# Patient Record
Sex: FEMALE | Race: WHITE | URBAN - METROPOLITAN AREA
[De-identification: names, ages, dates, MRNs, and addresses within clinical notes are randomized per-mention and may not be internally consistent; named-entity substitution may affect disease eponyms.]

---

## 2022-12-09 ENCOUNTER — HOSPITAL ENCOUNTER (OUTPATIENT)
Dept: PHYSICAL THERAPY | Age: 37
Setting detail: RECURRING SERIES
Discharge: HOME | End: 2022-12-12
Payer: COMMERCIAL

## 2022-12-09 PROCEDURE — 97530 THERAPEUTIC ACTIVITIES: CPT

## 2022-12-09 PROCEDURE — 97161 PT EVAL LOW COMPLEX 20 MIN: CPT

## 2022-12-09 ASSESSMENT — PAIN SCALES - GENERAL: PAINLEVEL_OUTOF10: 0

## 2022-12-09 NOTE — PROGRESS NOTES
Sukhjinder Mendoza  : 1985  Primary:   Secondary:  Cloyce Gun  [address] [facility] [address]  Phone: [phone]  Fax: [phone] Plan Frequency: 1x/week for 90 days    Plan of Care/Certification Expiration Date: 23      PT Visit Info: Total # of Visits to Date: 1  Progress Note Due Date: 23     Visit Count:  1   OUTPATIENT PHYSICAL THERAPY:OP NOTE TYPE: Treatment Note 2022       Episode  }Appt Desk             Treatment Diagnosis:  Lack of coordination (muscle incoordination) (R27.8)  Mixed incontinence (Urge and stress incontinence) (N39.46)  Feeling of incomplete bladder emptying (R39.14)  Low back pain (M54.5 )  Urgency of urination (R39.15)  Medical/Referring Diagnosis:  Stress incontinence (female) (male) [N39.3]  Referring Physician:  Provider, None  MD Orders:  PT Eval and Treat   Date of Onset:  No data recorded   Allergies:   Patient has no allergy information on record. Restrictions/Precautions:  Restrictions/Precautions: None  Required Braces or Orthoses?: No  No data recorded     Interventions Planned (Treatment may consist of any combination of the following):    Current Treatment Recommendations: Strengthening; ROM; Endurance training; Neuromuscular re-education; Pain management; Home exercise program; Patient/Caregiver education & training; Modalities; Therapeutic activities     Subjective Comments: mixed incontinence, pregnancy     Initial:}    0/10Post Session:       0/10  Medications Last Reviewed:  2022  Updated Objective Findings:  See evaluation note from today  Treatment   THERAPEUTIC ACTIVITY: ( 25 minutes): Functional activity education regarding anatomy, pathology and role of pelvic floor muscle (PFM) function in relation to presenting symptoms and role of pelvic floor therapy in conservative treatment.  and Instruction on coordinated pelvic floor and diaphragmatic breathing to improve kinesthetic awareness of pelvic muscle mobility and restore proper motor recruitment patterns with breathing, posture, and functional movement (e.g. appropriate lift/contraction with increased IAP such as a cough, laugh, sneeze to prevent incontinence as well as appropriate relaxation/drop to reduce pain with vaginal penetration). TA Educational Topic Notes Date Completed   Pathology/Anatomy/PFM Function Reviewed  12/9/22   Bladder health education     Urinary urge suppression     The knack     Voiding strategies     Nocturia tips     Bowel/Bladder log     Bowel health education     Constipation care     Diarrhea/Fecal leakage     Colonic massage     Toilet positioning     Defecation dynamics     Sources of fiber     Return to intercourse/Dilator training     Sexual positioning     Lubricants/vaginal moisturizers     Vulvovaginal health/vaginal irritants     Body mechanics Reviewed and practiced  12/9/22   Posture/ergonomics     Diaphragmatic breathing Reviewed with malick on exhale  12/9/22   Resources and technology     Other patient education       THERAPEUTIC EXERCISE: (0 minutes):    Exercises per grid below to improve mobility, strength, balance, and coordination. Required moderate visual, verbal, and tactile cues to promote proper body alignment, promote proper body posture, promote proper body mechanics, and promote proper body breathing techniques. Progressed resistance, range, repetitions, and complexity of movement as indicated. Date:  12/9/22 Date:   Date:     Activity/Exercise Parameters Parameters Parameters   butterfly reviewed     Double knee to chest reviewed     tamannaon reviewed       MANUAL THERAPY: (0 minutes): Soft tissue mobilization was utilized and necessary because of the patient's painful spasm and restricted motion of soft tissue.    Date Type Location Comments   12/9/2022 Internal assessment/treatment Via vaginal canal Not today                                        (Used abbreviations: MET - muscle energy technique; SCS- Strain counter strain; CTM-Connective tissue mobilizations; CR- Contract/Relax; SP- Sustained pressure; PIT- Positional inhibition techniques; STM Soft -tissue mobilization; MM- Myofascial mobilization; TrP-Trigger point release; IASTM- Instrument assisted soft tissue mobilizations, TDN-Trigger point dry needling)    Pt gives verbal consent to internal vaginal assessment/treatment without chaperon present. Treatment/Session Summary:    Treatment Assessment:   Pt reports good understanding of plan of care, as well as prescribed home exercise program. All questions were answered to pts satisfaction. Pt was invited to call with any further questions or concerns. Communication/Consultation:  None today  Equipment provided today:  None  Recommendations/Intent for next treatment session: Next visit will focus on   Biofeedback  Urge suppresiion  Stretching and strengthening.     Total Treatment Billable Duration:  55 minutes       Naz Lugo PT       Charge Capture  }Post Session Pain  PT Visit Info  [employer] Portal  MD Guidelines  Scanned Media  Benefits  MyChart    Future Appointments   Date Time Provider Pam Acuna   1/6/2023  1:00 PM Naz Lugo PT [facility]   1/11/2023 11:00 AM MICHEAL Christensen   1/18/2023 11:00 AM Naz Lugo PT [facility]   1/25/2023 11:00 AM MICHEAL Christensen

## 2022-12-09 NOTE — THERAPY EVALUATION
Jaylin Maybrook  : 1985  Primary:   Secondary:  Leticia Antonio  [address] [facility] [address]  Phone: [phone]  Fax: [phone] Plan Frequency: 1x/week for 90 days  Plan of Care/Certification Expiration Date: 23    PT Visit Info: Total # of Visits to Date: 1  Progress Note Due Date: 23    Visit Count:  1                OUTPATIENT PHYSICAL THERAPY:             OP NOTE TYPE: Initial Assessment 2022               Episode  Appt Desk         Treatment Diagnosis:  Lack of coordination (muscle incoordination) (R27.8)  Mixed incontinence (Urge and stress incontinence) (N39.46)  Frequency of micturition (R35.0)  Feeling of incomplete bladder emptying (R39.14)  Low back pain (M54.5 )  Urgency of urination (R39.15)  Medical/Referring Diagnosis:  Stress incontinence (female) (male) [N39.3]  Referring Physician:  Provider, None  MD Orders:  PT Eval and Treat   Return MD Appt:  TBD  Date of Onset:       Allergies:  Patient has no allergy information on record. Restrictions/Precautions:    Restrictions/Precautions: None  Required Braces or Orthoses?: No      Medications Last Reviewed:  2022     SUBJECTIVE   History of Injury/Illness (Reason for Referral):  Ms. Danny Mcmanus is a 41 yo female referred to pelvic floor physical therapy (PFPT) by Provider, None 2/2 Stress incontinence (female) (male) [N39.3] Pt reports that symptoms began with pregnancy. Patient had issues with leakage when moving from sitting to standing after her last pregnancy. Patient feels like her urinary frequency has increased a lot with this pregnancy. She is utilizing a voiding schedule. She feels like she has to proactively go to the bathroom or she has leakage. Patient reports that when she does go to the bathroom, not as much comes out as she would expect.    Patient reports that she has always had some issues with jumping, running, coughing sneezing and urinary leakage. Patient does not wear pads currently but feels like she needs to because of urinary leakage. Patient has a little bit of leakage a few times per day   Patient has urgency symptoms   Patient feeling like she isnt emptying her bladder completely  UUI and HOLLY  Less able to control bladder in standing position. Occasionally had this issue prior to pregnancy as well  Patient reports that she has some fecal urgency, and doesn't feel like she can hold back her stool for very long. Denies any leakage of stool   Last delivery Jan 2020  Currently 27 weeks along   Patient has some sciatic pain and lower back pain. This was before pregnancy as well          Urinary: Frequency 10+ x/day, 2 x/night. Positive for stress urinary incontinence, urge urinary incontinence, urinary urgency, urinary frequency, incomplete emptying, and nocturia. Negative for urinary hesitancy/intermittency, dysuria, hematuria, and nocturnal enuresis. Pt does not use pads for urinary protection; 0 pads per day (PPD). Fluid intake: 65 oz water/day; bladder irritants include: none. Pt does limit fluid intake due to bladder control. Bowel: Frequency 1x/day. Positive for  none . Negative for pain with bowel movement, pushing/straining with bowel movement, fecal incontinence, constipation, and incomplete emptying. Pt does not use pads for bowel protection; 0 pads per day (PPD). Use of stool softeners or laxatives? No Magnesium at night     Sexual: Pt is  sexually active with male partners. negative for dyspareunia. History of sexual abuse: No      OB History: Number of pregnancies: 2 Number of vaginal deliveries: 1 Number of c-sections: 0. Vacuum delivery, stage 2 tearing with stitches      Patient Stated Goal(s):  \"increase ability to hold bladder \"  Initial:     0/10 Post Session:     0/10  Past Medical History/Comorbidities:   Ms. Florecita Raygoza  has no past medical history on file.   Ms. Florecita Raygoza  has no past surgical history on file. Social History/Living Environment:   Lives With: Spouse; Daughter     Prior Level of Function/Work/Activity:   Current level of function: IND  Occupation: Part time employment  Type of Occupation: Speech therapist           Learning:   Does the patient/guardian have any barriers to learning?: No barriers  Will there be a co-learner?: No  What is the preferred language of the patient/guardian?: English  Is an  required?: No  How does the patient/guardian prefer to learn new concepts?: Listening; Reading; Demonstration; Pictures/Videos     Fall Risk Scale:    Herrmann Total Score: 0  Herrmann Fall Risk: Low (0-24)        OBJECTIVE   External Observations:  Voluntary contraction: [] absent     [x] present  Voluntary relaxation: [] absent     [x] present  Involuntary contraction: [x] absent     [] present  Involuntary relaxation: [] absent     [x] present  Perineal descent at rest: [x] absent     [] present  Perineal descent with bearing: [x] absent     [] present  Skin integrity: WNL  Postural assessment: WNL  Gait: WNL        Strength:   Left Right   Hip flexion (L2/3) 4/5 4/5             Knee extension (L3/4) 4/5 4/5   Hip internal rotation (L4-S1) 3/5 * pain 3/5 * pain   Hip external rotation (L4-S1) 3/5 * pain 3/5 *pain   Hip abduction (L4-S1) 3/5 3/5                    Range of motion:  LE ROM Left Right   Hip flexion (100-120) WNL WNL   Hip extension (15) WNL WNL   Knee flexion (>130) WNL WNL   Knee extension (0) WNL WNL   Hip internal rotation (30-40) limited limited   Hip external rotation (40-50) limited limited   Hip abduction (40-45) WNL WNL   Hip adduction WNL WNL     SPINE Left Right   Lumbar flexion (40-60; greatest at L4-5) WNL    Lumbar extension (20-25) WNL    Lumbar side bending (15-35; greatest at L3-4) WNL WNL   Lumbar rotation (5-20; greatest at L4-S1) WNL WNL   Thoracic flexion (20-40) WNL WNL   Thoracic extension (15-30)     Thoracic side bending (25-30)     Thoracic rotation (5-20)        External palpation:  Muscle/muscle group Tender? Adductors [] Right  [] Left  []DNT   Gluteals [] Right  [] Left  []DNT   Piriformis [] Right  [] Left  []DNT   Iliopsoas [] Right  [] Left  []DNT   Abdominal wall [] Right  [] Left  []DNT   Pubic symphysis [] Right  [] Left  []DNT     Breath assessment:  Observation: A/P chest expansion and lateral rib expansion  Coordination of pelvic floor muscles with breath: minimal pelvic floor muscle excursion  Co-contraction of PFM with transversus abdominis:  DNT    Special tests/Movement screens:   Single leg stance:  none        Pubic Symphysis Dysfunction Positive/Negative Sx onset in seconds   Single leg stance/Trendelenburg Negative    Palpation of pubic symphysis (+ if >5 seconds) negative          ASSESSMENT   Initial Assessment:  Narcisa Arechiga presents with musculoskeletal limitations including reduced coordination and awareness of PFM, reduced hip strength, poor diaphragm excursion, and decreased pelvic floor muscle (PFM) strength. These limitations are impairing the patient's ability to properly coordinate perineal elevation and descent for normalized PFM function, contributing to urinary dysfunction. As a result, she is limited in her/his ability to participate in entertainment activities such as going to a movie or concert, traveling by car or bus for a distance greater then 30 minutes away from home, and social activities outside of the home. Problem List: (Impacting functional limitations): Body Structures, Functions, Activity Limitations Requiring Skilled Therapeutic Intervention: Decreased ROM;  Decreased strength; Decreased endurance; Decreased coordination; Decreased posture     Therapy Prognosis:   Therapy Prognosis: Good     Initial Assessment Complexity:   Decision Making: Low Complexity    PLAN   Effective Dates: 12/9/22 TO Plan of Care/Certification Expiration Date: 03/09/23   Frequency/Duration: Plan Frequency: 1x/week for 90 days   Interventions Planned (Treatment may consist of any combination of the following):    Current Treatment Recommendations: Strengthening; ROM; Endurance training; Neuromuscular re-education; Pain management; Home exercise program; Patient/Caregiver education & training; Modalities; Therapeutic activities     Goals: (Goals have been discussed and agreed upon with patient.)  Short-Term Functional Goals: Time Frame: 6 weeks  Patient will demonstrate independence with basic pelvic floor muscle (PFM) home exercises program (HEP) to improve awareness, coordination, and timing of PFM. Patient will demonstrate understanding of and ability to teach back appropriate water intake, bladder irritants, toileting frequency, and positioning for improved self-management of symptoms. Patient will demonstrate appropriate use of the pelvic floor muscle group (quick flicks and/or drops), without compensation, to implement urge suppression appropriately with urgency of urination and decrease the number of pads per day or UI episodes by 50%. Discharge Goals: Time Frame: 12 weeks  Patient will demonstrate ability to voluntarily contract the pelvic floor muscles prior to a cough or sneeze for decreased leakage during increases in intra-abdominal pressure. Patient will demonstrate independence with an advanced HEP for general conditioning, core stabilization, and mobility to facilitate carry over and independent management of symptoms. Patient will be independent with implementation of a timed voiding schedule and use of urge suppression to reduce urinary frequency to 6-8x/day and 0-1x/night.            Outcome Measure:   Pelvic Floor Outcome Measure:  Pelvic Floor Impact Questionnaire--short form 7 (PFIQ-7):  Score:  Initial: 11  Bladder or Urine: 33/100  Bowel or Rectum: 0/100  Vagina or Pelvis: 0/100 Most Recent: X (Date: -- )  Bladder or Urine: X/100  Bowel or Rectum: X/100  Vagina or Pelvis: X/100   Interpretation of Score: Each of the 7 sections is scored on a scale from 0-3; 0 representing \"Not at all\", 3 representing \"Quite a bit\". The mean value is taken from all the answered items, then multiplied by 100 to obtain the scale score, ranging from 0-100. This process is repeated for each column representing bowel, bladder, and pelvic pain. Medical Necessity:   > Skilled intervention continues to be required due to above mentioned deficits. Reason For Services/Other Comments:  > Patient continues to require skilled intervention due to above mentioned deficits. Total Duration: 55 minutes  Time In: 1000  Time Out: 1055    Regarding Staci Padilla's therapy, I certify that the treatment plan above will be carried out by a therapist or under their direction. Thank you for this referral,  Tammi Alexandre, PT     Referring Physician Signature: Provider, None No Signature is Required for this note.         Post Session Pain  Charge Capture  PT Visit Info MD Guidelines  Kranthi

## 2023-01-04 PROBLEM — O09.522 HIGH RISK PREGNANCY, MULTIGRAVIDA OF ADVANCED MATERNAL AGE IN SECOND TRIMESTER: Status: ACTIVE | Noted: 2022-09-09

## 2023-01-04 PROBLEM — O09.529 MULTIGRAVIDA OF ADVANCED MATERNAL AGE: Status: ACTIVE | Noted: 2022-09-09

## 2023-01-04 PROBLEM — O36.5990 PREGNANCY AFFECTED BY FETAL GROWTH RESTRICTION: Status: ACTIVE | Noted: 2023-01-04

## 2023-01-04 RX ORDER — CALCIUM CARBONATE 300MG(750)
TABLET,CHEWABLE ORAL
COMMUNITY

## 2023-01-05 ENCOUNTER — ROUTINE PRENATAL (OUTPATIENT)
Dept: OBGYN CLINIC | Age: 38
End: 2023-01-05

## 2023-01-05 ENCOUNTER — HOSPITAL ENCOUNTER (INPATIENT)
Age: 38
LOS: 2 days | Discharge: HOME | DRG: 833 | End: 2023-01-07
Attending: STUDENT IN AN ORGANIZED HEALTH CARE EDUCATION/TRAINING PROGRAM | Admitting: OBSTETRICS & GYNECOLOGY
Payer: COMMERCIAL

## 2023-01-05 VITALS
SYSTOLIC BLOOD PRESSURE: 124 MMHG | HEIGHT: 64 IN | BODY MASS INDEX: 25.78 KG/M2 | WEIGHT: 151 LBS | DIASTOLIC BLOOD PRESSURE: 72 MMHG

## 2023-01-05 DIAGNOSIS — Z87.798 HISTORY OF CONGENITAL ABNORMALITY: ICD-10-CM

## 2023-01-05 DIAGNOSIS — Z82.79 FAMILY HISTORY OF CONGENITAL ANOMALIES: ICD-10-CM

## 2023-01-05 DIAGNOSIS — O09.523 HIGH RISK PREGNANCY, MULTIGRAVIDA OF ADVANCED MATERNAL AGE IN THIRD TRIMESTER: ICD-10-CM

## 2023-01-05 DIAGNOSIS — O41.03X0 OLIGOHYDRAMNIOS IN THIRD TRIMESTER, SINGLE OR UNSPECIFIED FETUS: ICD-10-CM

## 2023-01-05 DIAGNOSIS — Z3A.31 31 WEEKS GESTATION OF PREGNANCY: Primary | ICD-10-CM

## 2023-01-05 DIAGNOSIS — O36.5990 PREGNANCY AFFECTED BY FETAL GROWTH RESTRICTION: ICD-10-CM

## 2023-01-05 PROBLEM — O41.03X2: Status: ACTIVE | Noted: 2023-01-05

## 2023-01-05 LAB
ABO + RH BLD: NORMAL
AMNISURE, POC: NEGATIVE
BLOOD GROUP ANTIBODIES SERPL: NORMAL
BLOOD GROUP ANTIBODIES SERPL: NORMAL
ERYTHROCYTE [DISTWIDTH] IN BLOOD BY AUTOMATED COUNT: 13 % (ref 11.9–14.6)
HCT VFR BLD AUTO: 38 % (ref 35.8–46.3)
HGB BLD-MCNC: 13 G/DL (ref 11.7–15.4)
Lab: NORMAL
MCH RBC QN AUTO: 31.6 PG (ref 26.1–32.9)
MCHC RBC AUTO-ENTMCNC: 34.2 G/DL (ref 31.4–35)
MCV RBC AUTO: 92.5 FL (ref 82–102)
NEGATIVE QC PASS/FAIL: NORMAL
NRBC # BLD: 0 K/UL (ref 0–0.2)
PLATELET # BLD AUTO: 242 K/UL (ref 150–450)
PMV BLD AUTO: 9.2 FL (ref 9.4–12.3)
POSITIVE QC PASS/FAIL: NORMAL
RBC # BLD AUTO: 4.11 M/UL (ref 4.05–5.2)
SPECIMEN EXP DATE BLD: NORMAL
WBC # BLD AUTO: 9.2 K/UL (ref 4.3–11.1)
WEAK D AG RBC QL: NORMAL

## 2023-01-05 PROCEDURE — 6360000002 HC RX W HCPCS: Performed by: OBSTETRICS & GYNECOLOGY

## 2023-01-05 PROCEDURE — 85027 COMPLETE CBC AUTOMATED: CPT

## 2023-01-05 PROCEDURE — 86901 BLOOD TYPING SEROLOGIC RH(D): CPT

## 2023-01-05 PROCEDURE — 1100000000 HC RM PRIVATE

## 2023-01-05 PROCEDURE — 86870 RBC ANTIBODY IDENTIFICATION: CPT

## 2023-01-05 PROCEDURE — 2580000003 HC RX 258: Performed by: OBSTETRICS & GYNECOLOGY

## 2023-01-05 PROCEDURE — 36415 COLL VENOUS BLD VENIPUNCTURE: CPT

## 2023-01-05 RX ORDER — SODIUM CHLORIDE 0.9 % (FLUSH) 0.9 %
5-40 SYRINGE (ML) INJECTION EVERY 12 HOURS SCHEDULED
Status: DISCONTINUED | OUTPATIENT
Start: 2023-01-05 | End: 2023-01-07 | Stop reason: HOSPADM

## 2023-01-05 RX ORDER — PRENATAL WITH FERROUS FUM AND FOLIC ACID 3080; 920; 120; 400; 22; 1.84; 3; 20; 10; 1; 12; 200; 27; 25; 2 [IU]/1; [IU]/1; MG/1; [IU]/1; MG/1; MG/1; MG/1; MG/1; MG/1; MG/1; UG/1; MG/1; MG/1; MG/1; MG/1
1 TABLET ORAL DAILY
Status: CANCELLED | OUTPATIENT
Start: 2023-01-05

## 2023-01-05 RX ORDER — DEXTROSE, SODIUM CHLORIDE, SODIUM LACTATE, POTASSIUM CHLORIDE, AND CALCIUM CHLORIDE 5; .6; .31; .03; .02 G/100ML; G/100ML; G/100ML; G/100ML; G/100ML
INJECTION, SOLUTION INTRAVENOUS CONTINUOUS
Status: DISCONTINUED | OUTPATIENT
Start: 2023-01-05 | End: 2023-01-07 | Stop reason: HOSPADM

## 2023-01-05 RX ORDER — SODIUM CHLORIDE 9 MG/ML
INJECTION, SOLUTION INTRAVENOUS PRN
Status: DISCONTINUED | OUTPATIENT
Start: 2023-01-05 | End: 2023-01-07 | Stop reason: HOSPADM

## 2023-01-05 RX ORDER — BETAMETHASONE SODIUM PHOSPHATE AND BETAMETHASONE ACETATE 3; 3 MG/ML; MG/ML
12 INJECTION, SUSPENSION INTRA-ARTICULAR; INTRALESIONAL; INTRAMUSCULAR; SOFT TISSUE EVERY 24 HOURS
Status: COMPLETED | OUTPATIENT
Start: 2023-01-05 | End: 2023-01-06

## 2023-01-05 RX ORDER — BETAMETHASONE SODIUM PHOSPHATE AND BETAMETHASONE ACETATE 3; 3 MG/ML; MG/ML
12 INJECTION, SUSPENSION INTRA-ARTICULAR; INTRALESIONAL; INTRAMUSCULAR; SOFT TISSUE EVERY 24 HOURS
Status: CANCELLED | OUTPATIENT
Start: 2023-01-05 | End: 2023-01-07

## 2023-01-05 RX ORDER — SODIUM CHLORIDE 0.9 % (FLUSH) 0.9 %
5-40 SYRINGE (ML) INJECTION EVERY 12 HOURS SCHEDULED
Status: CANCELLED | OUTPATIENT
Start: 2023-01-05

## 2023-01-05 RX ORDER — PRENATAL VIT/IRON FUM/FOLIC AC 27MG-0.8MG
1 TABLET ORAL DAILY
Status: DISCONTINUED | OUTPATIENT
Start: 2023-01-05 | End: 2023-01-07 | Stop reason: HOSPADM

## 2023-01-05 RX ORDER — SODIUM CHLORIDE 0.9 % (FLUSH) 0.9 %
5-40 SYRINGE (ML) INJECTION PRN
Status: DISCONTINUED | OUTPATIENT
Start: 2023-01-05 | End: 2023-01-07 | Stop reason: HOSPADM

## 2023-01-05 RX ORDER — SODIUM CHLORIDE 0.9 % (FLUSH) 0.9 %
5-40 SYRINGE (ML) INJECTION PRN
Status: CANCELLED | OUTPATIENT
Start: 2023-01-05

## 2023-01-05 RX ORDER — SODIUM CHLORIDE 9 MG/ML
INJECTION, SOLUTION INTRAVENOUS PRN
Status: CANCELLED | OUTPATIENT
Start: 2023-01-05

## 2023-01-05 RX ADMIN — SODIUM CHLORIDE, SODIUM LACTATE, POTASSIUM CHLORIDE, CALCIUM CHLORIDE AND DEXTROSE MONOHYDRATE 250 ML/HR: 5; 600; 310; 30; 20 INJECTION, SOLUTION INTRAVENOUS at 13:06

## 2023-01-05 RX ADMIN — BETAMETHASONE SODIUM PHOSPHATE AND BETAMETHASONE ACETATE 12 MG: 3; 3 INJECTION, SUSPENSION INTRA-ARTICULAR; INTRALESIONAL; INTRAMUSCULAR at 13:05

## 2023-01-05 ASSESSMENT — PATIENT HEALTH QUESTIONNAIRE - PHQ9
2. FEELING DOWN, DEPRESSED OR HOPELESS: 0
1. LITTLE INTEREST OR PLEASURE IN DOING THINGS: 0
SUM OF ALL RESPONSES TO PHQ9 QUESTIONS 1 & 2: 0
SUM OF ALL RESPONSES TO PHQ QUESTIONS 1-9: 0

## 2023-01-05 NOTE — H&P
[facility] Admission History and Physical     Taj Vang (: 1985) is a 40 y.o. [place] Righter at 31w0d with 3/9/2023, by Other Basis. Presents for evaluation of the following chief complaint(s):  Pregnancy Ultrasound and High Risk Pregnancy (AMA, Suspected FGR at [facility]). Give history of some increased vaginal fluid discharge intermittently, some other leakage with standing, she thought was urine. Denies other medical problems. Patient is working full time (40 hours/week). No HAs, edema. Denies preeclamptic symptoms. Reports good fetal movement. No bleeding, LOF, cramping, ctxs, or vaginal pressure. History reviewed and updated as needed. Review of Systems - per HPI; otherwise unremarkable. Exam:                Vitals        Vitals:     23 1610 23 0945   BP:   124/72   Weight: 151 lb (68.5 kg)     Height: 5' 4\" (1.626 m)        Physical Exam  Constitutional:       Appearance: Normal appearance. She is normal weight. HENT:      Head: Normocephalic. Nose: Nose normal.   Eyes:      Conjunctiva/sclera: Conjunctivae normal.   Cardiovascular:      Rate and Rhythm: Normal rate and regular rhythm. Pulmonary:      Effort: Pulmonary effort is normal.   Neurological:      General: No focal deficit present. Mental Status: She is alert. Mental status is at baseline. She is disoriented. Skin:     General: Skin is warm and dry. Psychiatric:         Mood and Affect: Mood normal.         Behavior: Behavior normal.         Thought Content: Thought content normal.         Judgment: Judgment normal.      Recent Mood:  Mood concerns: well  Recent Labs Reviewed. Please see formal ultrasound report under imaging tab. ASSESSMENT/PLAN:        Patient Active Problem List     Diagnosis Date Noted    High risk pregnancy, multigravida of advanced maternal age in third trimester 2022       Priority: High       Dori pt  Low Risk NIPT   Neg carrier screen.   2023 at [facility]; Normal fetal anatomy, Normal fetal echo, Normal Umbilical Artery Doppler S/D ratio. Fetal growth normal range, AC 18th%tile, 42nd%tile overall. Oligohydramnios, BETTY noted to be 5.8 cm, single 2 x 2 pocket seen. No sign of genetic or viral cause of Oligo. Will treat as Utero-Placental insufficiency. History not conclusive of leaking fluid, could be PPROM. Will admit for course of steroids, extended FHR monitoring and IV hydration. Amniosure. Daily BPP/AFIs  Talk with Neonatologist.       Family history of congenital anomalies 01/05/2023       Priority: Medium       01/05/23 [facility]: Pt reports first child with Nelia syndrome and her maternal GM born with hole in heart. Oligohydramnios in third trimester 01/05/2023       Priority: Medium       1/5/2023 at [facility]; BETTY noted to be 5.8 cm, single 2 x 2 pocket seen. Pregnancy affected by fetal growth restriction 01/04/2023       Priority: Medium       01/05/23 [facility]: Not true FGR, will have patient increase calories and fluid intake. Mood evaluated today; PHQ2 reviewed. Counseling re possibility of peripartum worsening. An electronic signature was used to authenticate this note.   Cali Dubon MD

## 2023-01-05 NOTE — CONSULTS
Neonatology Prenatal Consult:    Prenatal Consult was requested by the obstetrician. Obstetrical Note:  Medical record and notes reviewed. Obstetrical Findings: Mother's Date of Admission: 2023 12:29 PM   Age: 40 y.o.  ROBERT: Estimated Date of Delivery: 3/9/23  Gestation by dates: 31w0d   Pregnancy:   Membrane status:        Social History     Socioeconomic History    Marital status:    Tobacco Use    Smoking status: Never     Passive exposure: Never    Smokeless tobacco: Never   Substance and Sexual Activity    Drug use: Never     Current Facility-Administered Medications   Medication Dose Route Frequency    sodium chloride flush 0.9 % injection 5-40 mL  5-40 mL IntraVENous 2 times per day    sodium chloride flush 0.9 % injection 5-40 mL  5-40 mL IntraVENous PRN    0.9 % sodium chloride infusion   IntraVENous PRN    betamethasone acetate-betamethasone sodium phosphate (CELESTONE) injection 12 mg  12 mg IntraMUSCular Q24H    prenatal vitamin tablet 1 tablet  1 tablet Oral Daily    dextrose 5 % in lactated ringers infusion   IntraVENous Continuous     Patient Active Problem List    Diagnosis Date Noted    Oligohydramnios in wells pregnancy in third trimester 2023    Pregnancy affected by fetal growth restriction 2023    High risk pregnancy, multigravida of advanced maternal age in third trimester 2022    Family history of congenital anomalies 2023       No results found for: PCTABR, ABORH, ABSCRNEXT, HBSAGEXT, HIVEXT, RUBELLAEXT, RPREXT, GONNOEXT, CHLAMEXT, GRBSEXT     Items below were discussed with the parents:      Neonatology coverage reviewed. Survival is very good to excellent. This improves with advancing gestational age. Expected LOS, dependent on gestational age and maturity skills reviewed.  resuscitation team attendance reviewed. Admissions procedures were explained. Family visitation policy were explained. RDS, at some risk. This risk decreases with advancing gestational age. Management of RDS was reviewed. Sepsis evaluation was explained. Feeding techniques reviewed. Mother plans to provide breast milk, yes. The benefits of breast milk were discussed in detail, and breast milk feedings is strongly recommended. Lactation services were also reviewed. Gut problems, NEC and infections are lessened with breast milk feedings. Significant IVH and ROP are at a low risk as compared to the extreme  gestation. This risk decreases with advancing gestational age. School readiness and learning problems are at a low but increased risk as compared to the full term gestation. Asthma-like problems later in life is at a low but increased risk as compared to term gestation. An increased risk is found with a family history or with smoking. Significant morbidity or complications are a low occurrence for a \"late \" , but with the potential for an extended hospital stay because of temperature maintenance and poor feeding skills. If male , have discussed potential for circumcision. Risks and benefits explained. Family advised to think carefully about this procedure before consenting. SIDS and safe infant sleep practices were reviewed. Family advised to maintain their flu and pertussis vaccinations. Local or primary pediatrician: to be determined. Recommendations: The state  regionalization guidelines were reviewed. The timing and place of delivery is determined by the obstetrical staff. The subsequent appropriate level of  care is determined by the  staff. It is appropriate for the infant to be receive care here in our  Care Unit, if after evaluation of the  infant, it is determined that greater than 32 weeks gestation and greater than 1500 gm, or that a higher level of care is not required, or with consultation with the level III  RPC.   If infant needs NCU care, then will plan to admit to the neonatology service. Attestation:     Total consultation time was 40 minutes with over 50% of the total time was spent in counseling or coordination of care. This included prognosis, risks and benefits of management (treatment) options, importance of compliance with chosen management (treatment) options, and patient and family education.         Signed: Julissa Prajapati MD  Today's Date: 1/5/2023

## 2023-01-05 NOTE — PROGRESS NOTES
[facility] Progress Note    Amniosure Negative. I explained plan to patient and  low BETTY could indicate uteroplacental insufficiency. Will repeat BETTY daily while giving course of steroids. If no improvement,  [facility]     Plan  [facility] to repeat BPP in am.

## 2023-01-05 NOTE — PROGRESS NOTES
[facility] Consultation and Admission History and Physical    Luciana Simmons (: 1985) is a 40 y.o. Leetta Feathers at 31w0d with 3/9/2023, by Other Basis. Presents for evaluation of the following chief complaint(s):  Pregnancy Ultrasound and High Risk Pregnancy (AMA, Suspected FGR at [facility]). Give history of some increased vaginal fluid discharge intermittently, some other leakage with standing, she thought was urine. Denies other medical problems. Patient is working full time (40 hours/week). No HAs, edema. Denies preeclamptic symptoms. Reports good fetal movement. No bleeding, LOF, cramping, ctxs, or vaginal pressure. History reviewed and updated as needed. Review of Systems - per HPI; otherwise unremarkable. Exam:     Vitals:    23 1610 23 0945   BP:  124/72   Weight: 151 lb (68.5 kg)    Height: 5' 4\" (1.626 m)    Physical Exam  Constitutional:       Appearance: Normal appearance. She is normal weight. HENT:      Head: Normocephalic. Nose: Nose normal.   Eyes:      Conjunctiva/sclera: Conjunctivae normal.   Cardiovascular:      Rate and Rhythm: Normal rate and regular rhythm. Pulmonary:      Effort: Pulmonary effort is normal.   Neurological:      General: No focal deficit present. Mental Status: She is alert. Mental status is at baseline. She is disoriented. Skin:     General: Skin is warm and dry. Psychiatric:         Mood and Affect: Mood normal.         Behavior: Behavior normal.         Thought Content: Thought content normal.         Judgment: Judgment normal.      Recent Mood:  Mood concerns: well  Recent Labs Reviewed. Please see formal ultrasound report under imaging tab. ASSESSMENT/PLAN:  Patient Active Problem List    Diagnosis Date Noted    High risk pregnancy, multigravida of advanced maternal age in third trimester 2022     Priority: High     Dori pt  Low Risk NIPT   Neg carrier screen.   2023 at [facility]; Normal fetal anatomy, Normal fetal echo, Normal Umbilical Artery Doppler S/D ratio. Fetal growth normal range, AC 18th%tile, 42nd%tile overall. Oligohydramnios, BETTY noted to be 5.8 cm, single 2 x 2 pocket seen. No sign of genetic or viral cause of Oligo. Will treat as Utero-Placental insufficiency. History not conclusive of leaking fluid, could be PPROM. Will admit for course of steroids, extended FHR monitoring and IV hydration. Amniosure. Daily BPP/AFIs  Talk with Neonatologist.      Family history of congenital anomalies 01/05/2023     Priority: Medium     01/05/23 [facility]: Pt reports first child with Gail syndrome and her maternal GM born with hole in heart. Oligohydramnios in third trimester 01/05/2023     Priority: Medium     1/5/2023 at [facility]; BETTY noted to be 5.8 cm, single 2 x 2 pocket seen. Pregnancy affected by fetal growth restriction 01/04/2023     Priority: Medium     01/05/23 [facility]: Not true FGR, will have patient increase calories and fluid intake. Mood evaluated today; PHQ2 reviewed. Counseling re possibility of peripartum worsening. An electronic signature was used to authenticate this note. Frantz Lieberman MD    I have spent 65 minutes reviewing previous notes, test results and face to face with the patient discussing the diagnosis and importance of compliance with the treatment plan, in addition to ultrasound findings, as well as documenting on the day of the visit (01/05/2023).

## 2023-01-06 ENCOUNTER — HOSPITAL ENCOUNTER (INPATIENT)
Dept: ULTRASOUND IMAGING | Age: 38
DRG: 833 | End: 2023-01-06
Payer: COMMERCIAL

## 2023-01-06 PROBLEM — O41.03X0 OLIGOHYDRAMNIOS IN THIRD TRIMESTER: Status: ACTIVE | Noted: 2023-01-06

## 2023-01-06 LAB
BLOOD BANK CMNT PATIENT-IMP: NORMAL
FETAL SCREEN: NORMAL

## 2023-01-06 PROCEDURE — 85461 HEMOGLOBIN FETAL: CPT

## 2023-01-06 PROCEDURE — 2580000003 HC RX 258: Performed by: OBSTETRICS & GYNECOLOGY

## 2023-01-06 PROCEDURE — 6360000002 HC RX W HCPCS: Performed by: OBSTETRICS & GYNECOLOGY

## 2023-01-06 PROCEDURE — 36415 COLL VENOUS BLD VENIPUNCTURE: CPT

## 2023-01-06 PROCEDURE — 76815 OB US LIMITED FETUS(S): CPT

## 2023-01-06 PROCEDURE — 76819 FETAL BIOPHYS PROFIL W/O NST: CPT

## 2023-01-06 PROCEDURE — 1100000000 HC RM PRIVATE

## 2023-01-06 RX ADMIN — SODIUM CHLORIDE, SODIUM LACTATE, POTASSIUM CHLORIDE, CALCIUM CHLORIDE AND DEXTROSE MONOHYDRATE: 5; 600; 310; 30; 20 INJECTION, SOLUTION INTRAVENOUS at 05:50

## 2023-01-06 RX ADMIN — BETAMETHASONE SODIUM PHOSPHATE AND BETAMETHASONE ACETATE 12 MG: 3; 3 INJECTION, SUSPENSION INTRA-ARTICULAR; INTRALESIONAL; INTRAMUSCULAR at 15:57

## 2023-01-06 NOTE — PROGRESS NOTES
Ante Partum High Risk Pregnancy Note    Patient admitted for oligohydramnios. States she does have mild vaginal leaking of fluid. Vitals:  Patient Vitals for the past 24 hrs:   BP Temp Temp src Pulse Resp SpO2   23 2041 133/79 98.4 °F (36.9 °C) Axillary (!) 111 18 --   23 1406 -- 98.5 °F (36.9 °C) Oral -- -- --   23 1200 124/78 98.2 °F (36.8 °C) -- 70 16 99 %     Temp (24hrs), Av.4 °F (36.9 °C), Min:98.2 °F (36.8 °C), Max:98.5 °F (36.9 °C)      No intake/output data recorded. Exam:  Patient: without distress.                Abdomen: soft, non-tender               Fundus: soft and non tender               Right upper quadrant: non-tender               Perineum: No sign of blood or amniotic fluid               Lower extremities: edema 1+               Patellar: Reflexes: 1+ bilaterally               Clonus: absent    Fetal Monitoring:  Baseline: 140 bpm    Uterine Activity: None        Labs:   Recent Results (from the past 24 hour(s))   POC AmniSure    Collection Time: 23 12:45 PM   Result Value Ref Range    Amnisure, POC Negative Negative    Lot Number [id number]     Positive QC Pass/Fail Pass     Negative QC Pass/Fail Pass    CBC    Collection Time: 23  1:13 PM   Result Value Ref Range    WBC 9.2 4.3 - 11.1 K/uL    RBC 4.11 4.05 - 5.2 M/uL    Hemoglobin 13.0 11.7 - 15.4 g/dL    Hematocrit 38.0 35.8 - 46.3 %    MCV 92.5 82.0 - 102.0 FL    MCH 31.6 26.1 - 32.9 PG    MCHC 34.2 31.4 - 35.0 g/dL    RDW 13.0 11.9 - 14.6 %    Platelets 331 801 - 038 K/uL    MPV 9.2 (L) 9.4 - 12.3 FL    nRBC 0.00 0.0 - 0.2 K/uL   TYPE AND SCREEN    Collection Time: 23  1:13 PM   Result Value Ref Range    Crossmatch expiration date 2023,2359     ABO/Rh O NEGATIVE     Antibody Screen POS     Antibody Ident ANTI-D     Weak D NEG    FETAL SCREEN    Collection Time: 23  7:08 AM   Result Value Ref Range    Fetal Screen NEG     Blood Bank Comment IMMUCOR LOT [id number] EXP 23        Patient Active Problem List    Diagnosis Date Noted    Oligohydramnios in wells pregnancy in third trimester 01/05/2023    Pregnancy affected by fetal growth restriction 01/04/2023    High risk pregnancy, multigravida of advanced maternal age in third trimester 09/09/2022    Family history of congenital anomalies 01/05/2023    Pt doing well. Amniosure was negative. RNST. Will monitor with serial US. Steroids in progress.

## 2023-01-06 NOTE — PROGRESS NOTES
0700 Bedside and Verbal shift change report given to JAYASHREE Escobedo RN (oncoming nurse) by Dieter Pace RN (offgoing nurse). Report included the following information Nurse Handoff Report. 0391 Dr. Shannon Rivero at bedside for maternal and fetal assessment. POC to continue inpatient until Dr. Anila Dubon reassesses pt. Tomorrow. 1155 Pt. Has visitors. She will call out when ready for NST. 1310 NST reactive. EFM and toco removed. 200 Dr. Anila Dubon called in. Orders received to monitor FHR for 2 hours tonight and two hours beginning at 0700 in the morning.

## 2023-01-06 NOTE — PROGRESS NOTES
MFM Antepartum Progress Note    40 y.o. Sam Jeffery at 31w1d by Estimated Date of Delivery: 3/9/23. Patient admitted for oligohydramnios. Hospital Day 1    Patient complains of anxiety re health of pregnancy. Patient is racking her brain re any issues which could have impacted baby Theletra or her placenta. Had Covid just prior to conception. No known TORCH infections. Normal BP. No story for LOF. Amnisure negative. Appropriate growth. Reassuring fetal movement. Patient denies HA, abdominal pain, vision changes. Minimal edema. No chest pain or shortness of breath. No significant reflux, nausea, constipation, or other GI complaints. No regular contractions, LOF, VB. Good FM. ROS per HPI, otherwise unremarkable. Current Facility-Administered Medications:     sodium chloride flush 0.9 % injection 5-40 mL, 5-40 mL, IntraVENous, 2 times per day, Samuel Carson MD    sodium chloride flush 0.9 % injection 5-40 mL, 5-40 mL, IntraVENous, PRN, Samuel Carson MD    0.9 % sodium chloride infusion, , IntraVENous, PRN, Samuel Carson MD    prenatal vitamin tablet 1 tablet, 1 tablet, Oral, Daily, Samuel Carson MD    dextrose 5 % in lactated ringers infusion, , IntraVENous, Continuous, Samuel Carson MD, Stopped at 01/06/23 1333    Vitals:    Vitals:    01/05/23 2041 01/06/23 0911 01/06/23 1155 01/06/23 1553   BP: 133/79 (!) 100/59 110/63 (!) 100/58   Pulse: (!) 111 88 97 98   Resp: 18 17  18   Temp: 98.4 °F (36.9 °C) 98 °F (36.7 °C)  98.1 °F (36.7 °C)   TempSrc: Axillary Axillary  Oral   SpO2:    100%       I&O:  01/06 0701 - 01/06 1900  In: 335 [I.V.:771]  Out: -             No intake/output data recorded. Maternal and Fetal Monitoring:                              Uterine Activity:                        Fetal Heart Rate:           Labs:    Lab results reviewed. For significant abnormal values and values requiring intervention, see assessment and plan.   CBC:    Recent Labs     01/05/23  1313 WBC 9.2   HGB 13.0   HCT 38.0        Imaging:    Formal report in chart. Date: 2023   Breech complete  BETTY 5.9cm; DVP 3.7DS  MCA, umbilical and DV Doppler studies all wnl for gestational age    A/P: 40 y.o.  at 31w1d    1) Oligohydramnios without etiology. Repeat BETTY tomorrow. HLIV, no improvement with IVF hydration. If remains stable, will consider dc home with close follow up (at minimum M/W/F BETTY evaluation) versus remaining inpt due to unclear etiology of BETTY concerns. If concern for fetal well-being, pt will need delivery. She is aware of current [facility] census constraints as well as gestational age limitations for delivery at [facility]. If all else reassuring, attempt to get to [id number] prior to delivery for isolated oligohydramnios. 2) No evidence of preeclampsia. 3) No evidence of TORCH infection or COVID placentitis. D/W patient and primary OB. Time:55    Minutes spent on floor,with greater than 50% of the time examining patient, explaining plan and coordinating care with nurse and requesting primary physician. ICD-10-CM    1. Oligohydramnios in third trimester, single or unspecified fetus  O41. 03X0 US FETAL BIOPHYSICAL PROFILE WO NON STRESS TESTING     US FETAL BIOPHYSICAL PROFILE WO NON STRESS TESTING     US OB 1 OR MORE FETUS LIMITED     US OB 1 OR MORE FETUS LIMITED

## 2023-01-07 ENCOUNTER — HOSPITAL ENCOUNTER (INPATIENT)
Dept: ULTRASOUND IMAGING | Age: 38
DRG: 833 | End: 2023-01-07
Payer: COMMERCIAL

## 2023-01-07 VITALS
TEMPERATURE: 98.1 F | OXYGEN SATURATION: 98 % | HEART RATE: 101 BPM | RESPIRATION RATE: 16 BRPM | SYSTOLIC BLOOD PRESSURE: 109 MMHG | DIASTOLIC BLOOD PRESSURE: 66 MMHG

## 2023-01-07 PROBLEM — O41.03X0 OLIGOHYDRAMNIOS IN THIRD TRIMESTER: Status: RESOLVED | Noted: 2023-01-06 | Resolved: 2023-01-07

## 2023-01-07 PROCEDURE — 59025 FETAL NON-STRESS TEST: CPT

## 2023-01-07 PROCEDURE — 90715 TDAP VACCINE 7 YRS/> IM: CPT | Performed by: OBSTETRICS & GYNECOLOGY

## 2023-01-07 PROCEDURE — 2580000003 HC RX 258: Performed by: OBSTETRICS & GYNECOLOGY

## 2023-01-07 PROCEDURE — 76815 OB US LIMITED FETUS(S): CPT

## 2023-01-07 PROCEDURE — 6360000002 HC RX W HCPCS: Performed by: OBSTETRICS & GYNECOLOGY

## 2023-01-07 PROCEDURE — 96372 THER/PROPH/DIAG INJ SC/IM: CPT

## 2023-01-07 PROCEDURE — 90471 IMMUNIZATION ADMIN: CPT | Performed by: OBSTETRICS & GYNECOLOGY

## 2023-01-07 PROCEDURE — 76819 FETAL BIOPHYS PROFIL W/O NST: CPT

## 2023-01-07 RX ORDER — TETANUS AND DIPHTHERIA TOXOIDS ADSORBED 2; 2 [LF]/.5ML; [LF]/.5ML
0.5 INJECTION INTRAMUSCULAR ONCE
Status: DISCONTINUED | OUTPATIENT
Start: 2023-01-07 | End: 2023-01-07 | Stop reason: RX

## 2023-01-07 RX ADMIN — TETANUS TOXOID, REDUCED DIPHTHERIA TOXOID AND ACELLULAR PERTUSSIS VACCINE, ADSORBED 0.5 ML: 5; 2.5; 8; 8; 2.5 SUSPENSION INTRAMUSCULAR at 11:15

## 2023-01-07 RX ADMIN — SODIUM CHLORIDE, PRESERVATIVE FREE 10 ML: 5 INJECTION INTRAVENOUS at 09:00

## 2023-01-07 NOTE — DISCHARGE INSTRUCTIONS
Counting Your Baby's Kicks: Care Instructions  Overview     Counting your baby's kicks is one way your doctor can tell that your baby is healthy. Most women--especially in a first pregnancy--feel their baby move for the first time between 16 and 22 weeks. The movement may feel like flutters rather than kicks. Your baby may move more at certain times of the day. When you are active, you may notice less kicking than when you are resting. At your prenatal visits, your doctor will ask whether the baby is active. In your last trimester, your doctor may ask you to count the number of times you feel your baby move. Follow-up care is a key part of your treatment and safety. Be sure to make and go to all appointments, and call your doctor if you are having problems. It's also a good idea to know your test results and keep a list of the medicines you take. How do you count fetal kicks? A common method of checking your baby's movement is to note the length of time it takes to count ten movements (such as kicks, flutters, or rolls). Pick your baby's most active time of day to count. This may be any time from morning to evening. If you don't feel 10 movements in an hour, have something to eat or drink and count for another hour. If you don't feel at least 10 movements in the 2-hour period, call your doctor. When should you call for help? Call your doctor now or seek immediate medical care if:    You noticed that your baby has stopped moving or is moving much less than normal.   Watch closely for changes in your health, and be sure to contact your doctor if you have any problems. Where can you learn more? Go to http://www.woods.com/ and enter U048 to learn more about \"Counting Your Baby's Kicks: Care Instructions. \"  Current as of: February 23, 2022               Content Version: 13.5  © 7469-2276 Healthwise, Incorporated. Care instructions adapted under license by [facility] ([facility]).  If you have questions about a medical condition or this instruction, always ask your healthcare professional. [address] any warranty or liability for your use of this information. Pregnancy Precautions: Care Instructions  Your Care Instructions     There is no sure way to prevent labor before your due date ( labor) or to prevent most other pregnancy problems. But there are things you can do to increase your chances of a healthy pregnancy. Go to your appointments, follow your doctor's advice, and take good care of yourself. Eat well, and exercise (if your doctor agrees). And make sure to drink plenty of water. Follow-up care is a key part of your treatment and safety. Be sure to make and go to all appointments, and call your doctor if you are having problems. It's also a good idea to know your test results and keep a list of the medicines you take. How can you care for yourself at home? Make sure you go to your prenatal appointments. At each visit, your doctor will check your blood pressure. Your doctor will also check to see if you have protein in your urine. High blood pressure and protein in urine are signs of preeclampsia. This condition can be dangerous for you and your baby. Drink plenty of fluids. Dehydration can cause contractions. If you have kidney, heart, or liver disease and have to limit fluids, talk with your doctor before you increase the amount of fluids you drink. Tell your doctor right away if you notice any symptoms of an infection, such as:  Burning when you urinate. A foul-smelling discharge from your vagina. Vaginal itching. Unexplained fever. Unusual pain or soreness in your uterus or lower belly. Eat a balanced diet. Include plenty of foods that are high in calcium and iron. Foods high in calcium include milk, cheese, yogurt, almonds, and broccoli.   Foods high in iron include red meat, shellfish, poultry, eggs, beans, raisins, whole-grain bread, and leafy green vegetables. Do not smoke. If you need help quitting, talk to your doctor about stop-smoking programs and medicines. These can increase your chances of quitting for good. Do not drink alcohol or use marijuana or illegal drugs. Follow your doctor's directions about activity. Your doctor will let you know how much, if any, exercise you can do. Ask your doctor if you can have sex. If you are at risk for early labor, your doctor may ask you to not have sex. Take care to prevent falls. During pregnancy, your joints are loose, and your balance is off. Sports such as bicycling, skiing, or in-line skating can increase your risk of falling. And don't ride horses or motorcycles, dive, water ski, scuba dive, or parachute jump while you are pregnant. Avoid things that can make your body too hot and may be harmful to your baby, such as a hot tub or sauna. Or talk with your doctor before doing anything that raises your body temperature. Your doctor can tell you if it's safe. Do not take any over-the-counter or herbal medicines or supplements without talking to your doctor or pharmacist first.  When should you call for help? Call 911  anytime you think you may need emergency care. For example, call if:    You passed out (lost consciousness). You have a seizure. You have severe vaginal bleeding. You have severe pain in your belly or pelvis. You have had fluid gushing or leaking from your vagina and you know or think the umbilical cord is bulging into your vagina. If this happens, immediately get down on your knees so your rear end (buttocks) is higher than your head. This will decrease the pressure on the cord until help arrives. Call your doctor now or seek immediate medical care if:    You have signs of preeclampsia, such as:  Sudden swelling of your face, hands, or feet. New vision problems (such as dimness, blurring, or seeing spots). A severe headache. You have any vaginal bleeding. You have belly pain or cramping. You have a fever. You have had regular contractions (with or without pain) for an hour. This means that you have 8 or more within 1 hour or 4 or more in 20 minutes after you change your position and drink fluids. You have a sudden release of fluid from your vagina. You have low back pain or pelvic pressure that does not go away. You notice that your baby has stopped moving or is moving much less than normal.   Watch closely for changes in your health, and be sure to contact your doctor if you have any problems. Where can you learn more? Go to http://www.woods.com/ and enter Y951 to learn more about \"Pregnancy Precautions: Care Instructions. \"  Current as of: February 23, 2022               Content Version: 13.5  © 2006-2022 Healthwise, Incorporated. Care instructions adapted under license by [facility] ([facility]). If you have questions about a medical condition or this instruction, always ask your healthcare professional. [place] [address] any warranty or liability for your use of this information.

## 2023-01-07 NOTE — DISCHARGE SUMMARY
MFM Antepartum Discharge Note    40 y.o. Patsy Weinstein at 31w2d by Estimated Date of Delivery: 3/9/23. Patient admitted for  Oligohydramnios, concern for fetal well being . Work up for PPROM negativeHospital Day 2  Patient complains of nothing today, no vaginal bleeding or leaking, none since admission. Active fetal movement. Patient denies HA, abdominal pain, vision changes. Minimal edema. No chest pain or shortness of breath. No significant reflux, nausea, constipation, or other GI complaints. No regular contractions, LOF, VB. Good FM. ROS per HPI, otherwise unremarkable. No current facility-administered medications for this encounter. Current Outpatient Medications:     Prenatal Vit-Fe Fumarate-FA (PRENATAL 1+1 PO), Prenatal, Disp: , Rfl:     Magnesium 400 MG TABS, magnesium, Disp: , Rfl:     Vitals:    Vitals:    01/06/23 2002 01/06/23 2334 01/07/23 0655 01/07/23 0918   BP: (!) 102/56 (!) 95/54 (!) 103/58 109/66   Pulse: 97 87 83 (!) 101   Resp: 16 16  16   Temp: 97.8 °F (36.6 °C) 97.7 °F (36.5 °C)  98.1 °F (36.7 °C)   TempSrc: Oral Oral  Oral   SpO2: 100% 98%         I&O:  No intake/output data recorded. 01/05 1901 - 01/07 0700  In: 771 [I.V.:771]  Out: -     Exam:   Constitutional: Patient without distress. HEENT: Symmetric without facial palsy, uncorrected poor hearing or uncorrected poor vision. No thyroid enlargement or goiter  Chest: No use of accessory muscles or excessive work of breathing    Abdomen: gravid, soft; soft, nontender, no HSM, no guarding, no rebound, no masses    Fundus: soft and non tender  Genitourinary:  deferred as without complaints of labor or ROM  Cervical Exam:  Not evaluated. Skin: normal coloration and turgor, no rashes, no suspicious skin lesions noted. Neurologic: AOx3. Gait normal. Reflexes and motor strength normal and symmetric. Cranial nerves 2-12 and sensation grossly intact.   Psychiatric: Mood appropriate, non focal    Maternal and Fetal Monitoring:                              Uterine Activity:   Contraction Intensity: N/A                    Fetal Heart Rate:         Non Stress Test Interpretation  Date: - reviewed again. Reactive, without decels    From 2023 Time On: 0700  Time [id number]    FHR Baseline Rate: 125 bpm  Periodic Changes: Yes   Variability: Moderate    Results: Reactive, Reassuring, without decelerations    MD: Jose 55:  CBC:    Lab Results   Component Value Date/Time    WBC 9.2 2023 01:13 PM    Hemoglobin 13.0 2023 01:13 PM    Hematocrit 38.0 2023 01:13 PM    Platelets 671  01:13 PM     Imaging: Bedside US done in my presence by PAMELLA Nelson RDMS   Formal report in chart. Date: 2023   BPP 8/8 and BETTY= 5.7 cm    A/P: 40 y.o.  at 31w2d, Full Course of Steroids. No leaking of fluid, not contractions, no FHR decelerations, all FHR tracings reassuring. BETTY remains low normal but stable at 5.5- 5.9 cms. Daily BPPs all reassuring at 8/8. UA Doppler S/D ratios all normal, MCA PV normal, Fetal blood screen negative. Low BETTY completely unexplained. Will have to treat as Uteroplacental insufficiency. I talked with the patient and  at length. Since she is feeling regular fetal movment, I offered her to go home with strict [address] instructions to return. Will follow closely with BPPs every other day. Expectant management until 36 weeks. F/U at [facility] on Monday. Time:45    Minutes spent on floor,with greater than 50% of the time examining patient, explaining plan and coordinating care with nurse and requesting primary physician.

## 2023-01-09 ENCOUNTER — ROUTINE PRENATAL (OUTPATIENT)
Dept: OBGYN CLINIC | Age: 38
End: 2023-01-09
Payer: COMMERCIAL

## 2023-01-09 VITALS — DIASTOLIC BLOOD PRESSURE: 66 MMHG | SYSTOLIC BLOOD PRESSURE: 106 MMHG

## 2023-01-09 DIAGNOSIS — O09.523 HIGH RISK PREGNANCY, MULTIGRAVIDA OF ADVANCED MATERNAL AGE IN THIRD TRIMESTER: Primary | ICD-10-CM

## 2023-01-09 DIAGNOSIS — Z3A.31 31 WEEKS GESTATION OF PREGNANCY: ICD-10-CM

## 2023-01-09 DIAGNOSIS — Z13.32 ENCOUNTER FOR SCREENING FOR MATERNAL DEPRESSION: ICD-10-CM

## 2023-01-09 DIAGNOSIS — O36.5990 PREGNANCY AFFECTED BY FETAL GROWTH RESTRICTION: ICD-10-CM

## 2023-01-09 DIAGNOSIS — O41.03X0 OLIGOHYDRAMNIOS IN SINGLETON PREGNANCY IN THIRD TRIMESTER: ICD-10-CM

## 2023-01-09 PROCEDURE — 96127 BRIEF EMOTIONAL/BEHAV ASSMT: CPT | Performed by: OBSTETRICS & GYNECOLOGY

## 2023-01-09 PROCEDURE — 76820 UMBILICAL ARTERY ECHO: CPT | Performed by: OBSTETRICS & GYNECOLOGY

## 2023-01-09 PROCEDURE — 76819 FETAL BIOPHYS PROFIL W/O NST: CPT | Performed by: OBSTETRICS & GYNECOLOGY

## 2023-01-09 PROCEDURE — 99214 OFFICE O/P EST MOD 30 MIN: CPT | Performed by: OBSTETRICS & GYNECOLOGY

## 2023-01-09 ASSESSMENT — PATIENT HEALTH QUESTIONNAIRE - PHQ9
SUM OF ALL RESPONSES TO PHQ QUESTIONS 1-9: 0
SUM OF ALL RESPONSES TO PHQ QUESTIONS 1-9: 0
SUM OF ALL RESPONSES TO PHQ9 QUESTIONS 1 & 2: 0
SUM OF ALL RESPONSES TO PHQ QUESTIONS 1-9: 0
SUM OF ALL RESPONSES TO PHQ QUESTIONS 1-9: 0
1. LITTLE INTEREST OR PLEASURE IN DOING THINGS: 0
2. FEELING DOWN, DEPRESSED OR HOPELESS: 0

## 2023-01-09 NOTE — PROGRESS NOTES
[facility] Follow-up Visit    Aneta Brothers (: 1985) is a 40 y.o. Arvdago Tiffanie at 31w4d with 3/9/2023, by Other Basis. Presents for evaluation of the following chief complaint(s):  Pregnancy Ultrasound and High Risk Pregnancy (AMA, FGR )     Patient is working full time (40 hours/week). No HAs, edema. Denies preeclamptic symptoms. Reports good fetal movement. No bleeding, LOF, cramping, ctxs, or vaginal pressure. Interval history since prior appt reviewed and updated as indicated. Review of Systems - per HPI; otherwise unremarkable. Exam:     Vitals:    23 1339   BP: 106/66     Recent Mood: well  Recent Labs reviewed and addressed. Ultrasound: Please see formal ultrasound report under imaging tab. ASSESSMENT/PLAN:  Patient Active Problem List    Diagnosis Date Noted    Oligohydramnios in wells pregnancy in third trimester 2023     Priority: High     2023 at [facility]; BETTY noted to be 5.8 cm, single 2 x 2 pocket seen. SFE 2023: amnisure negative. 23 [facility]: BETTY 8.8cm, BPP 8/8, Pt reports good kick counts. High risk pregnancy, multigravida of advanced maternal age in third trimester 2022     Priority: High     Dori pt  Low Risk NIPT   Neg carrier screen. 2023 at [facility]; Normal fetal anatomy, Normal fetal echo, Normal Umbilical Artery Doppler S/D ratio. Fetal growth normal range, AC 18th%tile, 42nd%tile overall. Oligohydramnios, BETTY noted to be 5.8 cm, single 2 x 2 pocket seen. No sign of genetic or viral cause of Oligo. Will treat as Utero-Placental insufficiency. History not conclusive of leaking fluid, could be PPROM.  23 [facility]: Reassuring fetal status. BETTY 8.8cm, BPP 8/8    F/U MFM Wed for BPP/BETTY      Family history of congenital anomalies 2023     Priority: Low     23 [facility]: Pt reports first child with Nelia syndrome and her maternal GM born with hole in heart. BETTY improved.    Continue close surveillance as unexplained. Strict precautions. Mood evaluated today; PHQ2 reviewed. Counseling re possibility of peripartum worsening. Mood Reassuring today     Addressed normal pregnancy complaints, reassured and offered suggestions for care  Reviewed gestational age precautions and activity goals/limitations  Nutritional counseling as well as specific goals based on current maternal and fetal status  Options for GERD therapy if becomes symptomatic over course of pregnancy  Gestational age appropriate preventive care regarding communicable disease transmission and vaccines as appropriate (including flu, TDaP, and COVID.)  Additional plans and concerns as documented in problem list.   All questions answered and concerns discussed. An electronic signature was used to authenticate this note. Nery Mccormick MD    I have spent 32 minutes reviewing previous notes, test results and face to face with the patient discussing the diagnosis and importance of compliance with the treatment plan, in addition to ultrasound findings, as well as documenting on the day of the visit (01/09/2023). Return for bpp. Patient Active Problem List   Diagnosis Code    High risk pregnancy, multigravida of advanced maternal age in third trimester O09.523    Family history of congenital anomalies Z82.79    Oligohydramnios in wells pregnancy in third trimester O37. 03X0     Visit Diagnoses         Codes    31 weeks gestation of pregnancy     Z3A.31    Encounter for screening for maternal depression     Z13.32

## 2023-01-11 ENCOUNTER — ROUTINE PRENATAL (OUTPATIENT)
Dept: OBGYN CLINIC | Age: 38
End: 2023-01-11

## 2023-01-11 VITALS — DIASTOLIC BLOOD PRESSURE: 71 MMHG | SYSTOLIC BLOOD PRESSURE: 117 MMHG | HEART RATE: 107 BPM

## 2023-01-11 DIAGNOSIS — O41.03X0 OLIGOHYDRAMNIOS IN SINGLETON PREGNANCY IN THIRD TRIMESTER: ICD-10-CM

## 2023-01-11 DIAGNOSIS — O09.523 HIGH RISK PREGNANCY, MULTIGRAVIDA OF ADVANCED MATERNAL AGE IN THIRD TRIMESTER: ICD-10-CM

## 2023-01-11 DIAGNOSIS — Z13.32 ENCOUNTER FOR SCREENING FOR MATERNAL DEPRESSION: ICD-10-CM

## 2023-01-11 DIAGNOSIS — Z3A.31 31 WEEKS GESTATION OF PREGNANCY: Primary | ICD-10-CM

## 2023-01-11 ASSESSMENT — PATIENT HEALTH QUESTIONNAIRE - PHQ9
2. FEELING DOWN, DEPRESSED OR HOPELESS: 1
SUM OF ALL RESPONSES TO PHQ QUESTIONS 1-9: 2
SUM OF ALL RESPONSES TO PHQ QUESTIONS 1-9: 2
SUM OF ALL RESPONSES TO PHQ9 QUESTIONS 1 & 2: 2
SUM OF ALL RESPONSES TO PHQ QUESTIONS 1-9: 2
1. LITTLE INTEREST OR PLEASURE IN DOING THINGS: 1
SUM OF ALL RESPONSES TO PHQ QUESTIONS 1-9: 2

## 2023-01-11 NOTE — PROGRESS NOTES
[facility] Follow-up Visit    Toña Lock (: 1985) is a 40 y.o. Rita Hongda at [address] with 3/9/2023, by Other [employer]asis. Presents for evaluation of the following chief complaint(s):  Ultrasound and High Risk Pregnancy (AMA, FGR )     Patient has been recommended to be on decreased activity  Scheduled to see primary OB (Dori) on 23    No HAs, edema. Denies preeclamptic symptoms. Reports good fetal movement. No bleeding, LOF, cramping, ctxs, or vaginal pressure. Interval history since prior appt reviewed and updated as indicated. Review of Systems - per HPI; otherwise unremarkable. Exam:     Vitals:    23 1142   BP: 117/71   Pulse: (!) 107     Recent Mood: well and happy  Recent Labs reviewed and addressed. Ultrasound: Please see formal ultrasound report under imaging tab. ASSESSMENT/PLAN:  Patient Active Problem List    Diagnosis Date Noted    Oligohydramnios in wells pregnancy in third trimester 2023     Priority: High     Overview Note:     2023 at [facility]; BETTY noted to be 5.8 cm, single 2 x 2 pocket seen. SFE 2023: amnisure negative. 23 [facility]: BETTY 8.8cm, BPP 8/8, Pt reports good kick counts. 23 [facility]: BETTY 7.4cm,BPP 8/8, good fetal movement       High risk pregnancy, multigravida of advanced maternal age in third trimester 2022     Priority: High     Overview Note:     Dori pt  Low Risk NIPT   Neg carrier screen. 2023 at [facility]; Normal fetal anatomy, Normal fetal echo, Normal Umbilical Artery Doppler S/D ratio. Fetal growth normal range, AC 18th%tile, 42nd%tile overall. Oligohydramnios, BETTY noted to be 5.8 cm, single 2 x 2 pocket seen. No sign of genetic or viral cause of Oligo. Will treat as Utero-Placental insufficiency. History not conclusive of leaking fluid, could be PPROM.  23 [facility]: Reassuring fetal status. BETTY 8.8cm, BPP 8/8  23 [facility]: Reassuring fetal status. BETTY 7.4cm,BPP 8/8.   Was hospitalized last -Saturday 1/7, got IV fluids, steroids  Has appointment with Dori 1/12/23  F/U MFM Friday for BPP/BETTY  Kick counts       Family history of congenital anomalies 01/05/2023     Priority: Low     Overview Note:     01/05/23 [facility]: Pt reports first child with Cecilio syndrome and her maternal GM born with hole in heart. Mood evaluated today; PHQ2 reviewed. Counseling re possibility of peripartum worsening. Mood Reassuring today     Addressed normal pregnancy complaints, reassured and offered suggestions for care  Reviewed gestational age precautions and activity goals/limitations  Nutritional counseling as well as specific goals based on current maternal and fetal status  Options for GERD therapy if becomes symptomatic over course of pregnancy  Gestational age appropriate preventive care regarding communicable disease transmission and vaccines as appropriate (including flu, TDaP, and COVID.)  Additional plans and concerns as documented in problem list.   All questions answered and concerns discussed. An electronic signature was used to authenticate this note. Barbara Zamora MD    I have spent 20 minutes reviewing previous notes, test results and face to face with the patient discussing the diagnosis and importance of compliance with the treatment plan, in addition to ultrasound findings, as well as documenting on the day of the visit (01/11/2023). Return in about 2 days (around 1/13/2023) for bpp. Patient Active Problem List   Diagnosis Code    High risk pregnancy, multigravida of advanced maternal age in third trimester O09.523    Family history of congenital anomalies Z82.79    Oligohydramnios in wells pregnancy in third trimester O37. 03X0     Visit Diagnoses         Codes    31 weeks gestation of pregnancy    -  Primary Z3A.31    BMI 25.0-25.9,adult     Z68.25    Encounter for screening for maternal depression     Z13.32

## 2023-01-13 ENCOUNTER — ROUTINE PRENATAL (OUTPATIENT)
Dept: OBGYN CLINIC | Age: 38
End: 2023-01-13

## 2023-01-13 VITALS — SYSTOLIC BLOOD PRESSURE: 105 MMHG | DIASTOLIC BLOOD PRESSURE: 70 MMHG

## 2023-01-13 DIAGNOSIS — O41.03X0 OLIGOHYDRAMNIOS IN SINGLETON PREGNANCY IN THIRD TRIMESTER: ICD-10-CM

## 2023-01-13 DIAGNOSIS — Z3A.32 32 WEEKS GESTATION OF PREGNANCY: ICD-10-CM

## 2023-01-13 DIAGNOSIS — O09.523 HIGH RISK PREGNANCY, MULTIGRAVIDA OF ADVANCED MATERNAL AGE IN THIRD TRIMESTER: Primary | ICD-10-CM

## 2023-01-13 ASSESSMENT — PATIENT HEALTH QUESTIONNAIRE - PHQ9
2. FEELING DOWN, DEPRESSED OR HOPELESS: 0
SUM OF ALL RESPONSES TO PHQ9 QUESTIONS 1 & 2: 0
SUM OF ALL RESPONSES TO PHQ QUESTIONS 1-9: 0
SUM OF ALL RESPONSES TO PHQ QUESTIONS 1-9: 0
1. LITTLE INTEREST OR PLEASURE IN DOING THINGS: 0
SUM OF ALL RESPONSES TO PHQ QUESTIONS 1-9: 0
SUM OF ALL RESPONSES TO PHQ QUESTIONS 1-9: 0

## 2023-01-13 NOTE — PROGRESS NOTES
[facility] Follow-up Visit    Kimo Starr (: 1985) is a 40 y.o. Loann Ing at [address] [place] [address] with 3/9/2023, by Other Basis. Presents for evaluation of the following chief complaint(s):  Pregnancy Ultrasound and High Risk Pregnancy (AMA, FGR)     Patient has been recommended to be on decreased activity  Scheduled to see primary OB (Dori) on 23     No HAs, edema. Denies preeclamptic symptoms. Reports good fetal movement. No bleeding, LOF, cramping, ctxs, or vaginal pressure. Interval history since prior appt reviewed and updated as indicated. Review of Systems - per HPI; otherwise unremarkable. Exam:     Vitals:    23 0837   BP: 105/70     Recent Mood: well  Recent Labs reviewed and addressed. Ultrasound: Please see formal ultrasound report under imaging tab. ASSESSMENT/PLAN:  Patient Active Problem List    Diagnosis Date Noted    Oligohydramnios in wells pregnancy in third trimester 2023     Priority: High     Overview Note:     2023 at [facility]; BETTY noted to be 5.8 cm, single 2 x 2 pocket seen. SFE 2023: amnisure negative. 23 [facility]: BETTY 8.8cm, BPP 8/8, Pt reports good kick counts. 23 [facility]: BETTY 7.4cm,BPP 8/8, good fetal movement     See HR Overview      High risk pregnancy, multigravida of advanced maternal age in third trimester 2022     Priority: High     Overview Note:     Dori pt  Low Risk NIPT   Neg carrier screen. 2023 at [facility]; Normal fetal anatomy, Normal fetal echo, Normal Umbilical Artery Doppler S/D ratio. Fetal growth normal range, AC 18th%tile, 42nd%tile overall. Oligohydramnios, BETTY noted to be 5.8 cm, single 2 x 2 pocket seen. No sign of genetic or viral cause of Oligo. Will treat as Utero-Placental insufficiency. History not conclusive of leaking fluid, could be PPROM.  23 [facility]: Reassuring fetal status. BETTY 8.8cm, BPP 8/8  23 [facility]: Reassuring fetal status. BETTY 7.4cm,BPP 8/8.   Was hospitalized last Thursday 1/5-Saturday 1/7, got IV fluids, steroids  01/13/23 [facility]: Reassuring fetal status; BETTY 6.6 cm, BPP 8/8. Stable. Active fetus, no decrease in movement. F/U at Hospital on [facility] at 1:00 for NST/ BPP/BETTY  Kick counts stressed  M/W/F testing at [facility] next week. Family history of congenital anomalies 01/05/2023     Priority: Medium     Overview Note:     01/05/23 [facility]: Pt reports first child with Cecilio syndrome and her maternal GM born with hole in heart. Mood evaluated today; PHQ2 reviewed. Counseling re possibility of peripartum worsening. Mood Reassuring today     Addressed normal pregnancy complaints, reassured and offered suggestions for care  Reviewed gestational age precautions and activity goals/limitations  Nutritional counseling as well as specific goals based on current maternal and fetal status  Options for GERD therapy if becomes symptomatic over course of pregnancy  Gestational age appropriate preventive care regarding communicable disease transmission and vaccines as appropriate (including flu, TDaP, and COVID.)  Additional plans and concerns as documented in problem list.   All questions answered and concerns discussed. An electronic signature was used to authenticate this note. Dixon Holguin MD    I have spent 40 minutes reviewing previous notes, test results and face to face with the patient discussing the diagnosis and importance of compliance with the treatment plan, in addition to ultrasound findings, as well as documenting on the day of the visit (01/13/2023). Return in 3 days (on 1/16/2023) for M-W-F next week BPP, BPP, Patient F/U. Patient Active Problem List   Diagnosis Code    High risk pregnancy, multigravida of advanced maternal age in third trimester O09.523    Family history of congenital anomalies Z82.79    Oligohydramnios in wells pregnancy in third trimester O37. 03X0     Visit Diagnoses         Codes    32 weeks gestation of pregnancy     Z3A.32

## 2023-01-15 ENCOUNTER — HOSPITAL ENCOUNTER (OUTPATIENT)
Age: 38
Discharge: HOME | End: 2023-01-15
Attending: OBSTETRICS & GYNECOLOGY | Admitting: OBSTETRICS & GYNECOLOGY
Payer: COMMERCIAL

## 2023-01-15 VITALS
SYSTOLIC BLOOD PRESSURE: 119 MMHG | TEMPERATURE: 98.2 F | OXYGEN SATURATION: 100 % | DIASTOLIC BLOOD PRESSURE: 74 MMHG | HEART RATE: 114 BPM | RESPIRATION RATE: 16 BRPM

## 2023-01-15 PROBLEM — O41.03X1 OLIGOHYDRAMNIOS ANTEPARTUM, THIRD TRIMESTER, FETUS 1: Status: ACTIVE | Noted: 2023-01-15

## 2023-01-15 PROCEDURE — 76818 FETAL BIOPHYS PROFILE W/NST: CPT

## 2023-01-15 PROCEDURE — 59025 FETAL NON-STRESS TEST: CPT

## 2023-01-15 PROCEDURE — 76820 UMBILICAL ARTERY ECHO: CPT | Performed by: OBSTETRICS & GYNECOLOGY

## 2023-01-15 PROCEDURE — 1100000000 HC RM PRIVATE

## 2023-01-15 PROCEDURE — 76818 FETAL BIOPHYS PROFILE W/NST: CPT | Performed by: OBSTETRICS & GYNECOLOGY

## 2023-01-15 PROCEDURE — 99282 EMERGENCY DEPT VISIT SF MDM: CPT

## 2023-01-15 NOTE — PROCEDURES
[facility] BPP      Inpatient BPP Report    Reason for Ultrasound- Oligohydramnios    Ultrasound performed at bedside for evaluation of pregnancy. Ultrasound Type: Transabdominal    Findings: Reassuring fetal status. See detailed report to follow on chart. Amniotic Fluid:  Low Normal , Amniotic Fluid Index was 5.6 centimeters. 2.1 x 2.5 AF pocket seen    WXT-9/9  Umbilical Artery Doppler S/D Ratio- Normal.    Impression:  Reassuring fetal condition. Low BETTY is Stable, without any significant changes.

## 2023-01-15 NOTE — PROGRESS NOTES
Pt discharged home with . Pt given discharge instructions on Kick counts. Pt also to return to [facility] tomorrow for regular appointment.

## 2023-01-16 ENCOUNTER — HOSPITAL ENCOUNTER (OUTPATIENT)
Age: 38
Discharge: HOME | End: 2023-01-16
Attending: OBSTETRICS & GYNECOLOGY | Admitting: STUDENT IN AN ORGANIZED HEALTH CARE EDUCATION/TRAINING PROGRAM

## 2023-01-16 ENCOUNTER — ROUTINE PRENATAL (OUTPATIENT)
Dept: OBGYN CLINIC | Age: 38
End: 2023-01-16
Payer: COMMERCIAL

## 2023-01-16 VITALS — DIASTOLIC BLOOD PRESSURE: 85 MMHG | SYSTOLIC BLOOD PRESSURE: 120 MMHG

## 2023-01-16 DIAGNOSIS — O41.03X0 OLIGOHYDRAMNIOS IN SINGLETON PREGNANCY IN THIRD TRIMESTER: ICD-10-CM

## 2023-01-16 DIAGNOSIS — O09.523 HIGH RISK PREGNANCY, MULTIGRAVIDA OF ADVANCED MATERNAL AGE IN THIRD TRIMESTER: Primary | ICD-10-CM

## 2023-01-16 DIAGNOSIS — Z3A.32 32 WEEKS GESTATION OF PREGNANCY: ICD-10-CM

## 2023-01-16 PROCEDURE — 76818 FETAL BIOPHYS PROFILE W/NST: CPT | Performed by: OBSTETRICS & GYNECOLOGY

## 2023-01-16 PROCEDURE — 99215 OFFICE O/P EST HI 40 MIN: CPT | Performed by: OBSTETRICS & GYNECOLOGY

## 2023-01-16 PROCEDURE — 96127 BRIEF EMOTIONAL/BEHAV ASSMT: CPT | Performed by: OBSTETRICS & GYNECOLOGY

## 2023-01-16 PROCEDURE — 76820 UMBILICAL ARTERY ECHO: CPT | Performed by: OBSTETRICS & GYNECOLOGY

## 2023-01-16 PROCEDURE — 1100000000 HC RM PRIVATE

## 2023-01-16 PROCEDURE — 59025 FETAL NON-STRESS TEST: CPT

## 2023-01-16 PROCEDURE — 76815 OB US LIMITED FETUS(S): CPT | Performed by: OBSTETRICS & GYNECOLOGY

## 2023-01-16 RX ORDER — MISOPROSTOL 100 UG/1
800 TABLET ORAL PRN
OUTPATIENT
Start: 2023-01-16

## 2023-01-16 RX ORDER — SODIUM CHLORIDE 0.9 % (FLUSH) 0.9 %
5-40 SYRINGE (ML) INJECTION PRN
OUTPATIENT
Start: 2023-01-16

## 2023-01-16 RX ORDER — SODIUM CHLORIDE 0.9 % (FLUSH) 0.9 %
5-40 SYRINGE (ML) INJECTION EVERY 12 HOURS SCHEDULED
OUTPATIENT
Start: 2023-01-16

## 2023-01-16 RX ORDER — METHYLERGONOVINE MALEATE 0.2 MG/ML
200 INJECTION INTRAVENOUS PRN
OUTPATIENT
Start: 2023-01-16

## 2023-01-16 RX ORDER — FAMOTIDINE 20 MG/1
20 TABLET, FILM COATED ORAL 2 TIMES DAILY
OUTPATIENT
Start: 2023-01-16

## 2023-01-16 RX ORDER — ONDANSETRON 2 MG/ML
4 INJECTION INTRAMUSCULAR; INTRAVENOUS EVERY 6 HOURS PRN
OUTPATIENT
Start: 2023-01-16

## 2023-01-16 RX ORDER — CARBOPROST TROMETHAMINE 250 UG/ML
250 INJECTION, SOLUTION INTRAMUSCULAR PRN
OUTPATIENT
Start: 2023-01-16

## 2023-01-16 RX ORDER — SODIUM CHLORIDE 9 MG/ML
25 INJECTION, SOLUTION INTRAVENOUS PRN
OUTPATIENT
Start: 2023-01-16

## 2023-01-16 ASSESSMENT — PATIENT HEALTH QUESTIONNAIRE - PHQ9
SUM OF ALL RESPONSES TO PHQ QUESTIONS 1-9: 0
1. LITTLE INTEREST OR PLEASURE IN DOING THINGS: 0
2. FEELING DOWN, DEPRESSED OR HOPELESS: 0
SUM OF ALL RESPONSES TO PHQ9 QUESTIONS 1 & 2: 0
SUM OF ALL RESPONSES TO PHQ QUESTIONS 1-9: 0

## 2023-01-16 NOTE — PROGRESS NOTES
Dr. Ana Carlson on phone. Orders to discharge. NICU bed available at [facility]. Pt driving to [facility] for admission. OK for pt to drive personal vehicle.

## 2023-01-16 NOTE — PROGRESS NOTES
[facility] Follow-up Visit    Marybel Hector (: 1985) is a 40 y.o. Jadine Port at [address] with 3/9/2023, by Other Basis. Presents for evaluation of the following chief complaint(s):  Pregnancy Ultrasound and High Risk Pregnancy (AMA, FGR, Oligo)  Patient has been seen every other day for last 2 weeks for BPP, NST. AFIs ranged 5-6 cm, always with 2 x 2 cm normal UA Doppler S/D ratio in normal range from 2-2.5, Reactive NSTs and 8/8 BPPa     No HAs, edema. Denies preeclamptic symptoms. Reports good fetal movement. No bleeding, LOF, cramping, ctxs, or vaginal pressure. Interval history since prior appt reviewed and updated as indicated. Review of Systems - per HPI; otherwise unremarkable. Exam:     Vitals:    23 0926   BP: 120/85     Recent Mood: well  Recent Labs reviewed and addressed. Ultrasound: Please see formal ultrasound report under imaging tab. ASSESSMENT/PLAN:  Patient Active Problem List    Diagnosis Date Noted    Oligohydramnios in wells pregnancy in third trimester 2023     Priority: High     Overview Note:     2023 at [facility]; BETTY noted to be 5.8 cm, single 2 x 2 pocket seen. SFE 2023: amnisure negative. 23 [facility]: BETTY 8.8cm, BPP 8/8, Pt reports good kick counts. 23 [facility]: BETTY 7.4cm,BPP 8/8, good fetal movement     See HR Overview      High risk pregnancy, multigravida of advanced maternal age in third trimester 2022     Priority: High     Overview Note:     Dori pt  Low Risk NIPT   Neg carrier screen. 2023 at [facility]; Normal fetal anatomy, Normal fetal echo, Normal Umbilical Artery Doppler S/D ratio. Fetal growth normal range, AC 18th%tile, 42nd%tile overall. Oligohydramnios, BETTY noted to be 5.8 cm, single 2 x 2 pocket seen. No sign of genetic or viral cause of Oligo. Will treat as Utero-Placental insufficiency. History not conclusive of leaking fluid, could be PPROM.  23 [facility]: Reassuring fetal status.   BETTY 8.8cm, BPP 23 [facility]: Reassuring fetal status. BETTY 7.4cm,BPP 8/8. Was hospitalized last Thursday 1/5-Saturday 1/7, got IV fluids, steroids  01/13/23 [facility]: Reassuring fetal status; BETTY 6.6 cm, BPP 8/8. Stable. Active fetus, no decrease in movement. 01/16/23 [facility]: Reassuring fetal status; Worsening Oligohydramnios. BETTY 4.4 cm, no 2 x 2 cm pocket seen,  NST reactive. Due to this severe Oligohydramnios, need to proceed with delivery. All NICUs in the state full, will admit to [facility] and keep on continuous monitoring until NICU bed available. Called and discussed plan with Dr. Castillo Corbett. Sent to [facility] for delivery      Family history of congenital anomalies 01/05/2023     Priority: Medium     Overview Note:     01/05/23 [facility]: Pt reports first child with Cecilio syndrome and her maternal GM born with hole in heart. Mood evaluated today; PHQ2 reviewed. Counseling re possibility of peripartum worsening. Mood Reassuring today     Addressed normal pregnancy complaints, reassured and offered suggestions for care  Reviewed gestational age precautions and activity goals/limitations  Nutritional counseling as well as specific goals based on current maternal and fetal status  Options for GERD therapy if becomes symptomatic over course of pregnancy  Gestational age appropriate preventive care regarding communicable disease transmission and vaccines as appropriate (including flu, TDaP, and COVID.)  Additional plans and concerns as documented in problem list.   All questions answered and concerns discussed. An electronic signature was used to authenticate this note. Nikky Hernandez MD    I have spent 40 minutes reviewing previous notes, test results and face to face with the patient discussing the diagnosis and importance of compliance with the treatment plan, in addition to ultrasound findings, as well as documenting on the day of the visit (01/16/2023). Return if symptoms worsen or fail to improve.     Patient Active Problem List   Diagnosis Code    High risk pregnancy, multigravida of advanced maternal age in third trimester O09.523    Family history of congenital anomalies Z82.79    Oligohydramnios in wells pregnancy in third trimester O41.03X0     Visit Diagnoses         Codes    32 weeks gestation of pregnancy     Z3A.32

## 2023-01-16 NOTE — DISCHARGE INSTRUCTIONS
Learning About Low Amniotic Fluid  What is low amniotic fluid? Low amniotic fluid means that there is too little fluid around your baby in the uterus during pregnancy. Having a low amount of this fluid can affect how the baby grows. It may lead to problems during labor and delivery. Amniotic fluid protects your baby from being bumped or hurt as you move your body. And it keeps your baby at a healthy temperature. The fluid helps your baby move around in the uterus. What causes low amniotic fluid? In many cases, the cause of low amniotic fluid may not be found. But causes may include:  A health problem you have, such as high blood pressure. A problem with the placenta. This is a large organ that grows in your uterus during pregnancy. It supplies your baby with nutrients and oxygen through the umbilical cord. Some medicines. A problem with the baby's kidneys or urinary tract. What are the symptoms of low amniotic fluid? Some of the symptoms may include:  Fluid leaking from your vagina. Your uterus not growing as expected. This means that the size of your pregnant belly is not as large as it should be, as measured from top to bottom by your doctor. Your baby's movements slowing down. How is low amniotic fluid diagnosed? Doctors use ultrasound to measure the amount of amniotic fluid in your uterus. How is low amniotic fluid treated? If you're near the end of your pregnancy, you may not need treatment. Depending on what's causing the problem and how close you are to delivery, your doctor may want to try to start (induce) labor. You may also be asked to drink more water. Or you may be given fluids through an intravenous (IV) needle into a vein. Your doctor may want to see you more often. Follow-up care is a key part of your treatment and safety. Be sure to make and go to all appointments, and call your doctor if you are having problems.  It's also a good idea to know your test results and keep a list of the medicines you take. Where can you learn more? Go to http://www.woods.com/ and enter A006 to learn more about \"Learning About Low Amniotic Fluid. \"  Current as of: 2022               Content Version: 13.5  © 8874-3283 [employer], [employer]. Care instructions adapted under license by [facility] ([facility]). If you have questions about a medical condition or this instruction, always ask your healthcare professional. [address] any warranty or liability for your use of this information. Pregnancy Precautions: Care Instructions  Your Care Instructions     There is no sure way to prevent labor before your due date ( labor) or to prevent most other pregnancy problems. But there are things you can do to increase your chances of a healthy pregnancy. Go to your appointments, follow your doctor's advice, and take good care of yourself. Eat well, and exercise (if your doctor agrees). And make sure to drink plenty of water. Follow-up care is a key part of your treatment and safety. Be sure to make and go to all appointments, and call your doctor if you are having problems. It's also a good idea to know your test results and keep a list of the medicines you take. How can you care for yourself at home? Make sure you go to your prenatal appointments. At each visit, your doctor will check your blood pressure. Your doctor will also check to see if you have protein in your urine. High blood pressure and protein in urine are signs of preeclampsia. This condition can be dangerous for you and your baby. Drink plenty of fluids. Dehydration can cause contractions. If you have kidney, heart, or liver disease and have to limit fluids, talk with your doctor before you increase the amount of fluids you drink. Tell your doctor right away if you notice any symptoms of an infection, such as:  Burning when you urinate.   A foul-smelling discharge from your vagina. Vaginal itching. Unexplained fever. Unusual pain or soreness in your uterus or lower belly. Eat a balanced diet. Include plenty of foods that are high in calcium and iron. Foods high in calcium include milk, cheese, yogurt, almonds, and broccoli. Foods high in iron include red meat, shellfish, poultry, eggs, beans, raisins, whole-grain bread, and leafy green vegetables. Do not smoke. If you need help quitting, talk to your doctor about stop-smoking programs and medicines. These can increase your chances of quitting for good. Do not drink alcohol or use marijuana or illegal drugs. Follow your doctor's directions about activity. Your doctor will let you know how much, if any, exercise you can do. Ask your doctor if you can have sex. If you are at risk for early labor, your doctor may ask you to not have sex. Take care to prevent falls. During pregnancy, your joints are loose, and your balance is off. Sports such as bicycling, skiing, or in-line skating can increase your risk of falling. And don't ride horses or motorcycles, dive, water ski, scuba dive, or parachute jump while you are pregnant. Avoid things that can make your body too hot and may be harmful to your baby, such as a hot tub or sauna. Or talk with your doctor before doing anything that raises your body temperature. Your doctor can tell you if it's safe. Do not take any over-the-counter or herbal medicines or supplements without talking to your doctor or pharmacist first.  When should you call for help? Call 911  anytime you think you may need emergency care. For example, call if:    You passed out (lost consciousness). You have a seizure. You have severe vaginal bleeding. You have severe pain in your belly or pelvis. You have had fluid gushing or leaking from your vagina and you know or think the umbilical cord is bulging into your vagina.  If this happens, immediately get down on your knees so your rear end (buttocks) is higher than your head. This will decrease the pressure on the cord until help arrives. Call your doctor now or seek immediate medical care if:    You have signs of preeclampsia, such as:  Sudden swelling of your face, hands, or feet. New vision problems (such as dimness, blurring, or seeing spots). A severe headache. You have any vaginal bleeding. You have belly pain or cramping. You have a fever. You have had regular contractions (with or without pain) for an hour. This means that you have 8 or more within 1 hour or 4 or more in 20 minutes after you change your position and drink fluids. You have a sudden release of fluid from your vagina. You have low back pain or pelvic pressure that does not go away. You notice that your baby has stopped moving or is moving much less than normal.   Watch closely for changes in your health, and be sure to contact your doctor if you have any problems. Where can you learn more? Go to http://www.woods.com/ and enter Y951 to learn more about \"Pregnancy Precautions: Care Instructions. \"  Current as of: February 23, 2022               Content Version: 13.5  © 2524-8986 [employer]wise, Incorporated. Care instructions adapted under license by [facility] ([facility]). If you have questions about a medical condition or this instruction, always ask your healthcare professional. [address] any warranty or liability for your use of this information.

## 2023-01-31 ENCOUNTER — ROUTINE PRENATAL (OUTPATIENT)
Dept: OBGYN CLINIC | Age: 38
End: 2023-01-31
Payer: COMMERCIAL

## 2023-01-31 VITALS — HEART RATE: 89 BPM | SYSTOLIC BLOOD PRESSURE: 109 MMHG | TEMPERATURE: 97.6 F | DIASTOLIC BLOOD PRESSURE: 86 MMHG

## 2023-01-31 DIAGNOSIS — O09.523 HIGH RISK PREGNANCY, MULTIGRAVIDA OF ADVANCED MATERNAL AGE IN THIRD TRIMESTER: Primary | ICD-10-CM

## 2023-01-31 DIAGNOSIS — O41.03X0 OLIGOHYDRAMNIOS IN SINGLETON PREGNANCY IN THIRD TRIMESTER: ICD-10-CM

## 2023-01-31 DIAGNOSIS — N71.9 ENDOMETRITIS: ICD-10-CM

## 2023-01-31 PROCEDURE — 99215 OFFICE O/P EST HI 40 MIN: CPT | Performed by: OBSTETRICS & GYNECOLOGY

## 2023-01-31 NOTE — PROGRESS NOTES
39  [facility] Follow-up Visit    Celeste River (: 1985) is a 40 y.o.  2 weeks postpartum. Presents for evaluation of the following chief complaint(s):  Possible Mastitis/Endometritis    Patient concerned of endometritis after experiencing it with last pregnancy 1 week postpartum. Patient placed on Antibiotic by OB and feeling relief in breast tenderness but still complaint of abdominal tenderness on left side. No HAs, edema. Denies preeclamptic symptoms. Normal vaginal discharge. Interval history since prior appt reviewed and updated as indicated. Review of Systems - per HPI; otherwise unremarkable. Exam:     Vitals:    23 1128   BP: 109/86   Pulse: 89   Temp: 97.6 °F (36.4 °C)     Recent Mood: well  Recent Labs reviewed and addressed. Ultrasound: Please see formal ultrasound report under imaging tab. Physical Exam  Constitutional:       Appearance: Normal appearance. Abdominal:      General: Abdomen is flat. A surgical scar is present. There is no distension. Tenderness: There is abdominal tenderness in the left lower quadrant. There is no guarding. Comments: Mild tenderness, mild rebound tenderness. Incision healing well, nonindurated, clean and dry. Neurological:      Mental Status: She is alert. Skin:     General: Skin is warm and dry. ASSESSMENT/PLAN:  Patient Active Problem List    Diagnosis Date Noted    Endometritis 2023     Priority: High     Overview Note:     23 [facility]: Patient began fever and breast tenderness on 23, OB MD called in antibiotic which is helping breasts. VS WDL, no temp 97.6. Breast exam normal, no streaking or hot spots. Patient states breast tenderness is better today. Patient now complaining of abdominal tenderness. Abdominal exam normal, c/s incision looks good, no signs or symptoms of infection. Sent patient to [facility] outpatient lab for CBC.       Family history of congenital anomalies 2023 Priority: Medium     Overview Note:     01/05/23 [facility]: Pt reports first child with [place] syndrome and her maternal GM born with hole in heart. Oligohydramnios in wells pregnancy in third trimester 01/05/2023     Priority: Medium     Overview Note:     1/5/2023 at [facility]; BETTY noted to be 5.8 cm, single 2 x 2 pocket seen. SFE 1/5/2023: amnisure negative. 01/09/23 [facility]: BETTY 8.8cm, BPP 8/8, Pt reports good kick counts. 01/11/23 [facility]: BETTY 7.4cm,BPP 8/8, good fetal movement     See HR Overview      High risk pregnancy, multigravida of advanced maternal age in third trimester 09/09/2022     Priority: Medium     Overview Note:     Dori pt  Low Risk NIPT   Neg carrier screen. 1/5/2023 at [facility]; Normal fetal anatomy, Normal fetal echo, Normal Umbilical Artery Doppler S/D ratio. Fetal growth normal range, AC 18th%tile, 42nd%tile overall. Oligohydramnios, BETTY noted to be 5.8 cm, single 2 x 2 pocket seen. No sign of genetic or viral cause of Oligo. Will treat as Utero-Placental insufficiency. History not conclusive of leaking fluid, could be PPROM.  01/09/23 [facility]: Reassuring fetal status. BETTY 8.8cm, BPP 8/8  01/11/23 [facility]: Reassuring fetal status. BETTY 7.4cm,BPP 8/8. Was hospitalized last Thursday 1/5-Saturday 1/7, got IV fluids, steroids. 01/13/23 [facility]: Reassuring fetal status; BETTY 6.6 cm, BPP 8/8. Stable. Active fetus, no decrease in movement. 01/16/23 [facility]: Reassuring fetal status; Worsening Oligohydramnios. BETTY 4.4 cm, no 2 x 2 cm pocket seen,  NST reactive. Due to this severe Oligohydramnios, need to proceed with delivery. All NICUs in the state full, will admit to [facility] and keep on continuous monitoring until NICU bed available. Called and discussed plan with Dr. Nadya Cornejo. Sent to [facility] for delivery          Mood evaluated today; PHQ2 reviewed. Counseling re possibility of peripartum worsening.    Mood Reassuring today     Addressed normal pregnancy complaints, reassured and offered suggestions for care  Reviewed gestational age precautions and activity goals/limitations  Nutritional counseling as well as specific goals based on current maternal and fetal status  Options for GERD therapy if becomes symptomatic over course of pregnancy  Gestational age appropriate preventive care regarding communicable disease transmission and vaccines as appropriate (including flu, TDaP, and COVID.)  Additional plans and concerns as documented in problem list.   All questions answered and concerns discussed. An electronic signature was used to authenticate this note. Chapito Ibarra MD    I have spent 45 minutes reviewing previous notes, test results and face to face with the patient discussing the diagnosis and importance of compliance with the treatment plan, in addition to ultrasound findings, as well as documenting on the day of the visit (01/31/2023). No follow-ups on file. Patient Active Problem List   Diagnosis Code    High risk pregnancy, multigravida of advanced maternal age in third trimester O09.523    Family history of congenital anomalies Z82.79    Oligohydramnios in wells pregnancy in third trimester O37. 03X0    Endometritis N71.9

## 2023-02-01 LAB
ERYTHROCYTE [DISTWIDTH] IN BLOOD BY AUTOMATED COUNT: 12 % (ref 11.9–14.6)
HCT VFR BLD AUTO: 40.7 % (ref 35.8–46.3)
HGB BLD-MCNC: 14.2 G/DL (ref 11.7–15.4)
MCH RBC QN AUTO: 32.8 PG (ref 26.1–32.9)
MCHC RBC AUTO-ENTMCNC: 34.9 G/DL (ref 31.4–35)
MCV RBC AUTO: 94 FL (ref 82–102)
NRBC # BLD: 0 K/UL (ref 0–0.2)
PLATELET # BLD AUTO: 291 K/UL (ref 150–450)
PMV BLD AUTO: 9.5 FL (ref 9.4–12.3)
RBC # BLD AUTO: 4.33 M/UL (ref 4.05–5.2)
WBC # BLD AUTO: 5.4 K/UL (ref 4.3–11.1)

## 2023-12-19 PROBLEM — Z80.0 FAMILY HISTORY OF COLON CANCER: Status: ACTIVE | Noted: 2023-12-19

## 2023-12-27 ENCOUNTER — TELEPHONE (OUTPATIENT)
Dept: GASTROENTEROLOGY | Age: 38
End: 2023-12-27

## 2023-12-28 ENCOUNTER — TELEPHONE (OUTPATIENT)
Dept: GASTROENTEROLOGY | Age: 38
End: 2023-12-28

## 2023-12-29 ENCOUNTER — TELEPHONE (OUTPATIENT)
Dept: GASTROENTEROLOGY | Age: 38
End: 2023-12-29

## 2024-07-05 NOTE — PROGRESS NOTES
[facility] - Initial Evaluation   Patient: Staci Padilla   YOB: 1985      Location: [facility] HEMATOLOGY AND ONCOLOGY - Provider participated in today's evaluation via telemedicine in [place], [place]. [place] completed today's evaluation from [place].   Date of Evaluation: 2024      Referral Source: Elise Prieto MD   Referral Reason: Z80.0 (ICD-10-CM) - Family history of colon cancer       Genetic Counselor: MS India Holley MS, [facility] available for consult as needed      Staci Padilla was referred for evaluation for the potential of hereditary cancer due to her family history of colon cancer. Staci has consented to a visit via telehealth.  Staci was unaccompanied to today's visit.  Personalized risk assessment was performed and genetic testing options were reviewed.  For full details, please see Risk Assessment and Discussion in this document.  Following genetic counseling, Staci's action plan is:    DEFERS TESTING: Following discussion, Staci did not opt to proceed with testing today.      Relevant Personal Medical History   Staci is a 38-year-old female with no personal history of cancer.     Hormonal history:  Age of Menarche: 12 years old   Number of Pregnancies: 2  Number of Live Births: 2  Age of First Live Birth: 35 years old   Age of Menopause: premenopausal   Contraception use: 3-4 years, then again for 6 months   Hormonal Replacement Therapy: No     Screening history:  Last mammogram: N/A  History of breast biopsy: N/A  Last pap smear: 2023  Latest colonoscopy: , due to rectal bleeding. Only finding was hemmrrhoids.   History of polyps: None   Last dermatological exam: yearly     Social history:  Tobacco use: No  Alcohol use: Rarely     General medical history:  None    Surgical history:  Hysterectomy: N/A  Bilateral salpingo oophorectomy: N/A  Other:       Family History   A detailed three-generation family history was taken today.  Staci

## 2024-07-23 ENCOUNTER — TELEMEDICINE (OUTPATIENT)
Dept: ONCOLOGY | Age: 39
End: 2024-07-23

## 2024-07-23 DIAGNOSIS — Z80.0 FAMILY HISTORY OF COLON CANCER: Primary | ICD-10-CM
